# Patient Record
Sex: MALE | Race: WHITE | URBAN - METROPOLITAN AREA
[De-identification: names, ages, dates, MRNs, and addresses within clinical notes are randomized per-mention and may not be internally consistent; named-entity substitution may affect disease eponyms.]

---

## 2019-10-14 ENCOUNTER — OFFICE VISIT (OUTPATIENT)
Dept: UROLOGY | Facility: CLINIC | Age: 24
End: 2019-10-14

## 2019-10-14 VITALS
RESPIRATION RATE: 18 BRPM | HEART RATE: 126 BPM | SYSTOLIC BLOOD PRESSURE: 150 MMHG | TEMPERATURE: 98.6 F | DIASTOLIC BLOOD PRESSURE: 84 MMHG

## 2019-10-14 DIAGNOSIS — N50.3 EPIDIDYMAL CYST: Primary | ICD-10-CM

## 2019-10-14 PROCEDURE — 99202 OFFICE O/P NEW SF 15 MIN: CPT | Performed by: UROLOGY

## 2019-10-14 SDOH — HEALTH STABILITY: MENTAL HEALTH: HOW OFTEN DO YOU HAVE A DRINK CONTAINING ALCOHOL?: NEVER

## 2019-10-14 NOTE — NURSING NOTE
Chief Complaint   Patient presents with     Consult     Right Epididymal Cyst        Initial BP (!) 150/84   Pulse 126   Temp 98.6  F (37  C) (Tympanic)   Resp 18  There is no height or weight on file to calculate BMI.  BP completed using cuff size: regular   Medications and allergies reviewed.      Jenelle DEVINE CMA

## 2019-10-14 NOTE — PATIENT INSTRUCTIONS
Per physician instructions.    If you have questions or concerns on any instructions given to you by your provider today or if you need to schedule an appointment, you can reach us at [phone].    Thank you!

## 2020-01-25 NOTE — PROGRESS NOTES
Appointment source: New Patient  Patient name: Shin Key  Urology Staff: Jone Fowler MD    Subjective: This is a 24 year old year old male complaining of right hemiscrotal swelling.    Has noted it for several months.    Denies any trauma or prior surgical procedures.on the testicles.    Review of systems: a comprehensive 10 point ROS was obtained and was otherwise negative except for that outlined above.    Problem list and histories reviewed & adjusted, as indicated.  Additional history: as documented    Objective:  Examination:    Healthy male  HEENT: anicteric sclera, normal extraocular movements  Respiratory: normal, non labored breathing  Musculoskeletal:Normal muscular movements  Skin normal temperature, no rash  Psychiatric: appropriate affect    No evidence of inguinal hernia  No evidence of inguinal adenopathy  Phallus without lesion. No evidence of peyronie's plaque  The right hemiscrotum does feel full but not neoplastic.  The epididymal cyst noted on the scrotal ultrasound is a subtle finding and is not obvious to palpation. Minimal discomfort noted during palpation.    Scrotal ultrasound revealed a benign appearing epididymal cyst    Assessment:  Painless benign epididymal cyst on the right.    Plan:  No intervention. Return PRN         No